# Patient Record
Sex: MALE | Race: ASIAN
[De-identification: names, ages, dates, MRNs, and addresses within clinical notes are randomized per-mention and may not be internally consistent; named-entity substitution may affect disease eponyms.]

---

## 2017-12-22 ENCOUNTER — HOSPITAL ENCOUNTER (OUTPATIENT)
Dept: HOSPITAL 92 - SCSRAD | Age: 32
Discharge: HOME | End: 2017-12-22
Attending: FAMILY MEDICINE
Payer: COMMERCIAL

## 2017-12-22 DIAGNOSIS — R76.11: Primary | ICD-10-CM

## 2017-12-22 PROCEDURE — 71020: CPT

## 2020-09-22 ENCOUNTER — HOSPITAL ENCOUNTER (OUTPATIENT)
Dept: HOSPITAL 92 - BICRAD | Age: 35
Discharge: HOME | End: 2020-09-22
Attending: FAMILY MEDICINE
Payer: COMMERCIAL

## 2020-09-22 DIAGNOSIS — Z87.442: Primary | ICD-10-CM

## 2020-09-22 PROCEDURE — 74018 RADEX ABDOMEN 1 VIEW: CPT

## 2020-09-22 NOTE — RAD
Abdomen one view



HISTORY: Abdominal pain. Renal calculi.



FINDINGS: No comparison available. Gas and stool overlying the colon. Small bowel gas pattern is nons
pecific.



No calcifications are apparent over either renal shadow or along the expected course of either ureter
 or the urinary bladder.



  



IMPRESSION :

No abnormalities are demonstrated.



Reported By: FADIA Fernandez 

Electronically Signed:  9/22/2020 4:39 PM